# Patient Record
Sex: MALE | Race: OTHER | HISPANIC OR LATINO | Employment: OTHER | URBAN - METROPOLITAN AREA
[De-identification: names, ages, dates, MRNs, and addresses within clinical notes are randomized per-mention and may not be internally consistent; named-entity substitution may affect disease eponyms.]

---

## 2023-01-04 ENCOUNTER — NEW PATIENT (OUTPATIENT)
Dept: URBAN - METROPOLITAN AREA CLINIC 6 | Facility: CLINIC | Age: 23
End: 2023-01-04

## 2023-01-04 DIAGNOSIS — Z01.00: ICD-10-CM

## 2023-01-04 PROCEDURE — 92004 COMPRE OPH EXAM NEW PT 1/>: CPT

## 2023-01-04 PROCEDURE — 92015 DETERMINE REFRACTIVE STATE: CPT | Mod: NC

## 2023-01-04 ASSESSMENT — TONOMETRY
OD_IOP_MMHG: 14
OS_IOP_MMHG: 14

## 2023-01-04 ASSESSMENT — VISUAL ACUITY
OS_CC: J1+
OS_CC: 20/20
OD_CC: 20/20
OD_CC: J1+

## 2023-01-11 ENCOUNTER — CONTACT LENS FITTING (OUTPATIENT)
Dept: URBAN - METROPOLITAN AREA CLINIC 6 | Facility: CLINIC | Age: 23
End: 2023-01-11

## 2023-01-11 DIAGNOSIS — H52.13: ICD-10-CM

## 2023-01-11 PROCEDURE — 92310 CONTACT LENS FITTING OU: CPT

## 2023-01-11 ASSESSMENT — KERATOMETRY
OD_K2POWER_DIOPTERS: 41.25
OD_AXISANGLE2_DEGREES: 79
OD_AXISANGLE_DEGREES: 169
OD_K1POWER_DIOPTERS: 40.75
OS_AXISANGLE_DEGREES: 002
OS_K2POWER_DIOPTERS: 41.75
OS_K1POWER_DIOPTERS: 41.25
OS_AXISANGLE2_DEGREES: 92

## 2023-01-20 ENCOUNTER — CONTACT LENS CHECK (OUTPATIENT)
Dept: URBAN - METROPOLITAN AREA CLINIC 6 | Facility: CLINIC | Age: 23
End: 2023-01-20

## 2023-01-20 DIAGNOSIS — H52.13: ICD-10-CM

## 2023-01-20 PROCEDURE — 92310 CONTACT LENS FITTING OU: CPT | Mod: NC

## 2023-01-20 ASSESSMENT — VISUAL ACUITY
OD_CC: J1+
OD_CC: 20/20
OS_CC: 20/20
OS_CC: J1+

## 2023-01-20 ASSESSMENT — KERATOMETRY
OD_AXISANGLE_DEGREES: 169
OS_K1POWER_DIOPTERS: 41.25
OD_K2POWER_DIOPTERS: 41.25
OS_AXISANGLE2_DEGREES: 92
OD_AXISANGLE2_DEGREES: 79
OD_K1POWER_DIOPTERS: 40.75
OS_K2POWER_DIOPTERS: 41.75
OS_AXISANGLE_DEGREES: 002

## 2023-04-25 ENCOUNTER — OFFICE VISIT (OUTPATIENT)
Dept: URGENT CARE | Facility: CLINIC | Age: 23
End: 2023-04-25

## 2023-04-25 VITALS
OXYGEN SATURATION: 97 % | HEART RATE: 89 BPM | SYSTOLIC BLOOD PRESSURE: 125 MMHG | TEMPERATURE: 97.7 F | DIASTOLIC BLOOD PRESSURE: 80 MMHG | RESPIRATION RATE: 16 BRPM

## 2023-04-25 DIAGNOSIS — M25.561 ACUTE PAIN OF RIGHT KNEE: Primary | ICD-10-CM

## 2023-04-25 NOTE — PROGRESS NOTES
[facility]        NAME: Shireen Flores is a 25 y o  male  : 2000    MRN: [id number]  DATE: 2023  TIME: 5:14 PM    Assessment and Plan   Acute pain of right knee [M25 561]  1  Acute pain of right knee  Ambulatory Referral to Orthopedic Surgery        - Brace applied    Patient Instructions   - Wear brace as needed  - Follow up with ortho  - Take Tylenol or Motrin as needed  - Recommend applying ice   Follow up with PCP in 3-5 days  Proceed to  ER if symptoms worsen  Chief Complaint     Chief Complaint   Patient presents with   • Knee Pain     Pt reports right knee pain worsening over the past two weeks  Unsure if he did anything to injure it  History of Present Illness       26 y/o M presents for R knee pain x 2 weeks  Pt denies any injury or changes in activity  Admits is intermitant and worse with flexion of the knee  Review of Systems   Review of Systems   Musculoskeletal: Negative for gait problem and joint swelling  Joint pain         Current Medications     No current outpatient medications on file  Current Allergies     Allergies as of 2023   • (No Known Allergies)            The following portions of the patient's history were reviewed and updated as appropriate: allergies, current medications, past family history, past medical history, past social history, past surgical history and problem list      History reviewed  No pertinent past medical history  History reviewed  No pertinent surgical history  No family history on file  Medications have been verified  Objective   /80   Pulse 89   Temp 97 7 °F (36 5 °C)   Resp 16   SpO2 97%   No LMP for male patient  Physical Exam     Physical Exam  Vitals and nursing note reviewed  Constitutional:       General: He is not in acute distress  Appearance: He is obese  He is not toxic-appearing  HENT:      Head: Normocephalic and atraumatic     Eyes: Conjunctiva/sclera: Conjunctivae normal    Musculoskeletal:         General: No swelling, tenderness, deformity or signs of injury  Normal range of motion  Comments: PT pulse intact  Non tender  No swelling  Lachman's, anterior drawer, posterior drawer, Juanita negative  No medial or lateral laxity of joint   Skin:     Findings: No bruising or erythema  Neurological:      Mental Status: He is alert     Psychiatric:         Mood and Affect: Mood normal          Behavior: Behavior normal

## 2023-05-08 ENCOUNTER — OFFICE VISIT (OUTPATIENT)
Dept: OBGYN CLINIC | Facility: OTHER | Age: 23
End: 2023-05-08

## 2023-05-08 ENCOUNTER — APPOINTMENT (OUTPATIENT)
Dept: RADIOLOGY | Facility: OTHER | Age: 23
End: 2023-05-08

## 2023-05-08 VITALS
HEIGHT: 67 IN | HEART RATE: 76 BPM | DIASTOLIC BLOOD PRESSURE: 76 MMHG | WEIGHT: 235 LBS | SYSTOLIC BLOOD PRESSURE: 121 MMHG | BODY MASS INDEX: 36.88 KG/M2

## 2023-05-08 DIAGNOSIS — M25.561 ACUTE PAIN OF RIGHT KNEE: ICD-10-CM

## 2023-05-08 DIAGNOSIS — Z01.89 RADIOLOGICAL EXAMINATION, NOT ELSEWHERE CLASSIFIED: ICD-10-CM

## 2023-05-08 DIAGNOSIS — M22.2X1 PATELLOFEMORAL DISORDER OF RIGHT KNEE: ICD-10-CM

## 2023-05-08 DIAGNOSIS — M76.31 IT BAND SYNDROME, RIGHT: Primary | ICD-10-CM

## 2023-05-08 NOTE — LETTER
May 8, 2023     Ronna Miles PA-C  [address] [place] [id number]    Patient: Jerome Gracia   YOB: 2000   Date of Visit: 5/8/2023       Dear Dr Nick Aiken: Thank you for referring Jerome Gracia to me for evaluation  Below are my notes for this consultation  If you have questions, please do not hesitate to call me  I look forward to following your patient along with you  Sincerely,        Cindy Fox MD        CC: No Recipients  Cindy Fox MD  5/8/2023  1:50 PM  Signed    Assessment  Diagnoses and all orders for this visit:    It band syndrome, right  -     Ambulatory referral to Physical Therapy; Future    Patellofemoral disorder of right knee  -     Ambulatory referral to Physical Therapy; Future      Discussion and Plan:    The patient has an examination consistent with iliotibial band friction syndrome of the right knee as well as patellofemoral dysfunction of the right knee  There is no evidence of structural surgical pathology on exam today and I do not recommend further imaging at this point although in the future I would not be against further imaging if his symptoms do not improve  I have provided him with a referral to physical therapy to treat his symptoms and he will follow-up on an as-needed basis with further imaging the form of an MRI if he does not improve    Subjective:   Patient ID: Jerome Gracia is a 25 y o  male      The patient reports to the office today with a chief complaint of right knee pain  Pain is anterior and lateral  Patient states there is no known mechanism of injury and the pain started approximately 1 month ago  Patient works as a  and states he does a lot of squatting, kneeling, and getting in/out of a work truck and that's when his pain flares up  Patient has tried OTC pain medication and a knee brace that was provided to him from urgent care on 4/25/23   Patient has not attempted physical therapy at "this time  He reports no weakness or numbness/tingling  The following portions of the patient's history were reviewed and updated as appropriate: allergies, current medications, past family history, past medical history, past social history, past surgical history and problem list     Review of Systems    Objective:  /76   Pulse 76   Ht 5' 6 5\" (1 689 m)   Wt 107 kg (235 lb)   BMI 37 36 kg/m²       Right Knee Exam     Muscle Strength   The patient has normal right knee strength  Tenderness   The patient is experiencing tenderness in the lateral joint line  Range of Motion   The patient has normal right knee ROM  Tests   Juanita:  Medial - negative Lateral - negative  Varus: negative Valgus: negative  Lachman:  Anterior - negative      Drawer:  Posterior - negative  Patellar apprehension: negative    Other   Sensation: normal  Pulse: present  Swelling: none            Physical Exam  Musculoskeletal:      Right knee:      Instability Tests: Medial Juanita test negative and lateral Juanita test negative  Patient tender over Marcelina's tubercle and along tract of the IT band as well as the anterolateral patellar retinaculum      I have personally reviewed pertinent films in PACS and my interpretation is as follows  Xray Right Knee 5/8/23: no acute osseous abnormalities  Scribe Attestation    I,:  Kalie Holm am acting as a scribe while in the presence of the attending physician :       I,:  Effie Magallon MD personally performed the services described in this documentation    as scribed in my presence  :             "

## 2023-05-08 NOTE — PROGRESS NOTES
"  Assessment  Diagnoses and all orders for this visit:    It band syndrome, right  -     Ambulatory referral to Physical Therapy; Future    Patellofemoral disorder of right knee  -     Ambulatory referral to Physical Therapy; Future      Discussion and Plan:    The patient has an examination consistent with iliotibial band friction syndrome of the right knee as well as patellofemoral dysfunction of the right knee  There is no evidence of structural surgical pathology on exam today and I do not recommend further imaging at this point although in the future I would not be against further imaging if his symptoms do not improve  I have provided him with a referral to physical therapy to treat his symptoms and he will follow-up on an as-needed basis with further imaging the form of an MRI if he does not improve    Subjective:   Patient ID: Parviz Galdamez is a 25 y o  male      The patient reports to the office today with a chief complaint of right knee pain  Pain is anterior and lateral  Patient states there is no known mechanism of injury and the pain started approximately 1 month ago  Patient works as a  and states he does a lot of squatting, kneeling, and getting in/out of a work truck and that's when his pain flares up  Patient has tried OTC pain medication and a knee brace that was provided to him from urgent care on 4/25/23  Patient has not attempted physical therapy at this time  He reports no weakness or numbness/tingling  The following portions of the patient's history were reviewed and updated as appropriate: allergies, current medications, past family history, past medical history, past social history, past surgical history and problem list     Review of Systems    Objective:  /76   Pulse 76   Ht 5' 6 5\" (1 689 m)   Wt 107 kg (235 lb)   BMI 37 36 kg/m²       Right Knee Exam     Muscle Strength   The patient has normal right knee strength      Tenderness   The patient is experiencing " tenderness in the lateral joint line  Range of Motion   The patient has normal right knee ROM  Tests   Juanita:  Medial - negative Lateral - negative  Varus: negative Valgus: negative  Lachman:  Anterior - negative      Drawer:  Posterior - negative  Patellar apprehension: negative    Other   Sensation: normal  Pulse: present  Swelling: none            Physical Exam  Musculoskeletal:      Right knee:      Instability Tests: Medial Juanita test negative and lateral Juanita test negative  Patient tender over Marcelina's tubercle and along tract of the IT band as well as the anterolateral patellar retinaculum      I have personally reviewed pertinent films in PACS and my interpretation is as follows  Xray Right Knee 5/8/23: no acute osseous abnormalities         Scribe Attestation    I,:  Aron Sousa am acting as a scribe while in the presence of the attending physician :       I,:  Romain Barlow MD personally performed the services described in this documentation    as scribed in my presence :

## 2023-05-11 ENCOUNTER — EVALUATION (OUTPATIENT)
Dept: PHYSICAL THERAPY | Facility: OTHER | Age: 23
End: 2023-05-11

## 2023-05-11 DIAGNOSIS — M25.561 ACUTE PAIN OF RIGHT KNEE: Primary | ICD-10-CM

## 2023-05-11 NOTE — PROGRESS NOTES
PT Evaluation     Today's date: 2023  Patient name: Shari Moon  : 2000  MRN: [id number]  Referring provider: Helen Olguin*  Dx: No diagnosis found  Assessment  Assessment details: Shari Moon is a pleasant 25 y o  male who presents with R sided knee pain  Mostly with kneeling and stepping up into a high truck  No injections or medication at this time  Goals to be ible to kneel  and HEP issued and POC reviewed  Impairments: abnormal coordination, abnormal muscle firing, abnormal or restricted ROM, activity intolerance, impaired physical strength, lacks appropriate home exercise program, pain with function and poor body mechanics    Symptom irritability: moderateUnderstanding of Dx/Px/POC: good   Prognosis: good    Goals  Short Term:  Pt will report decreased levels of pain by at least 2 subjective ratings in 4 weeks  Pt will demonstrate improved ROM by at least 10 degrees in 4 weeks  Pt will demonstrate improved strength by 1/2 grade  Long Term:   Pt will be independent in their HEP in 8 weeks  Pt will be be pain free with IADL's  Pt will demonstrate improved FOTO, > 80         Plan  Plan details: Prognosis above is given PT services 2x/week tapering to 1x/week over the next 3 months and home program adherence    Patient would benefit from: skilled physical therapy  Planned modality interventions: thermotherapy: hydrocollator packs  Planned therapy interventions: activity modification, joint mobilization, manual therapy, motor coordination training, neuromuscular re-education, patient education, self care, therapeutic activities, therapeutic exercise, graded activity and home exercise program  Frequency: 2x week  Treatment plan discussed with: patient        Subjective Evaluation    Pain  At best pain ratin  At worst pain ratin    Patient Goals  Patient goals for therapy: decreased pain, independence with ADLs/IADLs, return to sport/leisure activities, increased motion and increased strength          Objective     General Comments:      Knee Comments  Knee: post drawer =  Lachmans test=   -   Valgus stress test=- varus stress test =   -Juanita test   =  - joint line tenderness=-  Patella grind test-Patella mobility test=-      too's test=   P step up and over test= R sided weakness  functional squat=R sided SL sit stand weakness   Thessaly test =-  Knee MMT Flexion: 4/5 extension: 4/5  Hip MMT:  4/5   Knee ROM flex and extension: full               Hip:  scour= -    karen =    -  capsular mobility=       -               Precautions: none       Manuals 5 11            EPAT  cermic head 2500 3 5-3 9 PT                                                    Neuro Re-Ed             Slider              Er into wall              miniauts              Leg press                                                     Ther Ex                                                                                                                     Ther Activity                                       Gait Training                                       Modalities

## 2023-05-17 ENCOUNTER — OFFICE VISIT (OUTPATIENT)
Dept: PHYSICAL THERAPY | Facility: OTHER | Age: 23
End: 2023-05-17

## 2023-05-17 DIAGNOSIS — M25.561 ACUTE PAIN OF RIGHT KNEE: Primary | ICD-10-CM

## 2023-05-17 NOTE — PROGRESS NOTES
Daily Note     Today's date: 2023  Patient name: June Tipton  : 2000  MRN: [id number]  Referring provider: Narcisa Hurtado*  Dx: No diagnosis found  Subjective: progressing very well  Objective: See treatment diary below      Assessment: Tolerated treatment well  Patient demonstrated fatigue post treatment      Plan: Continue per plan of care        Precautions: none       Manuals 5 11 5 17 23           EPAT  cermic head 2500 3 5-3 9 PT  Metal head 2500 3 3-4 0                                                   Neuro Re-Ed             Slider   3way 5            Er into wall   5''x10            minisqauts   #10 10x3            Leg press   eccentrics #60 10x3            Sports cord side and bebeto step  5 laps each                                      Ther Ex             MWM I/ER tibia   Stool 5''x10            bike  5min                                                                                          Ther Activity                                       Gait Training                                       Modalities

## 2023-06-18 ENCOUNTER — OFFICE VISIT (OUTPATIENT)
Dept: URGENT CARE | Facility: CLINIC | Age: 23
End: 2023-06-18
Payer: COMMERCIAL

## 2023-06-18 VITALS
DIASTOLIC BLOOD PRESSURE: 90 MMHG | RESPIRATION RATE: 20 BRPM | OXYGEN SATURATION: 97 % | TEMPERATURE: 98.5 F | SYSTOLIC BLOOD PRESSURE: 138 MMHG

## 2023-06-18 DIAGNOSIS — B08.4 HAND, FOOT AND MOUTH DISEASE: Primary | ICD-10-CM

## 2023-06-18 DIAGNOSIS — J02.9 SORE THROAT: ICD-10-CM

## 2023-06-18 LAB — S PYO AG THROAT QL: NEGATIVE

## 2023-06-18 PROCEDURE — 87070 CULTURE OTHR SPECIMN AEROBIC: CPT

## 2023-06-18 PROCEDURE — 99213 OFFICE O/P EST LOW 20 MIN: CPT

## 2023-06-18 PROCEDURE — 87880 STREP A ASSAY W/OPTIC: CPT

## 2023-06-18 RX ORDER — CETIRIZINE HYDROCHLORIDE 5 MG/1
5 TABLET, CHEWABLE ORAL DAILY
COMMUNITY

## 2023-06-18 RX ORDER — FLUTICASONE PROPIONATE 50 MCG
1 SPRAY, SUSPENSION (ML) NASAL DAILY
COMMUNITY

## 2023-06-18 NOTE — PATIENT INSTRUCTIONS
Your rapid strep test was negative  No antibiotics are needed at this time  Throat swab will be sent for definitive culture  You can download [facility] for the results which take approximately 48-72 hours  You will be notified if positive  You can give ibuprofen/Motrin and acetaminophen/Tylenol as needed for pain or fever  For sore throat you can use Cepacol lozenges, do warm salt water gargles, drink warm water with lemon or herbal teas, or use an over-the-counter throat spray (Chloraseptic)  Follow up with your PCP in 3-5 days if symptoms persist     Go to the ER if symptoms significantly worsen

## 2023-06-18 NOTE — LETTER
June 18, 2023     Patient: Venice Mcdonald   YOB: 2000   Date of Visit: 6/18/2023       To Whom it May Concern:    Venice Mcdonald was seen in my clinic on 6/18/2023  He may return to work on 6/21/23   If you have any questions or concerns, please don't hesitate to call           Sincerely,          JOVANNI Alejandre        CC: No Recipients

## 2023-06-18 NOTE — PROGRESS NOTES
[facility]        NAME: Efren Palafox is a 26 y o  male  : 2000    MRN: [id number]  DATE: 2023  TIME: 1:50 PM    Assessment and Plan   Hand, foot and mouth disease [B08 4]  1  Hand, foot and mouth disease        2  Sore throat  POCT rapid strepA    Throat culture            Patient Instructions     Your rapid strep test was negative  No antibiotics are needed at this time  Throat swab will be sent for definitive culture  You can download [address] for the results which take approximately 48-72 hours  You will be notified if positive  You can give ibuprofen/Motrin and acetaminophen/Tylenol as needed for pain or fever  For sore throat you can use Cepacol lozenges, do warm salt water gargles, drink warm water with lemon or herbal teas, or use an over-the-counter throat spray (Chloraseptic)  Follow up with your PCP in 3-5 days if symptoms persist     Go to the ER if symptoms significantly worsen  Chief Complaint     Chief Complaint   Patient presents with   • Sore Throat     Pt reports sore throat x3 days   • Fever     Wed night to Thursday morning pt had a fever   • Bumps on B/L hands and feet     Pt reports bumps developed on hands and feet  Pt reports that they are painful with palpation and when he bears weight on feet  History of Present Illness       This is a 21yo male who presents for evaluation of a sore throat x3 days  He reports associated fevers and a painful rash to his hands and feet  The rash is not anywhere else on his body, it is not itchy  Denies HA, nasal congestion, rhinorrhea, cough, CP/SOB, and N/V/D  No known sick contacts  Review of Systems   Review of Systems   Constitutional: Positive for fever  Negative for chills and fatigue  HENT: Positive for sore throat  Negative for congestion, ear pain, rhinorrhea and sinus pressure  Eyes: Negative for discharge and redness     Respiratory: Negative for cough, chest tightness and shortness of breath  Cardiovascular: Negative for chest pain and palpitations  Gastrointestinal: Negative for abdominal pain, diarrhea, nausea and vomiting  Musculoskeletal: Negative for arthralgias and myalgias  Skin: Positive for rash  Neurological: Negative for dizziness, light-headedness and headaches  Current Medications       Current Outpatient Medications:   •  cetirizine (ZyrTEC) 5 MG chewable tablet, Chew 5 mg daily, Disp: , Rfl:   •  fluticasone (FLONASE) 50 mcg/act nasal spray, 1 spray into each nostril daily, Disp: , Rfl:     Current Allergies     Allergies as of 06/18/2023   • (No Known Allergies)            The following portions of the patient's history were reviewed and updated as appropriate: allergies, current medications, past family history, past medical history, past social history, past surgical history and problem list      History reviewed  No pertinent past medical history  History reviewed  No pertinent surgical history  History reviewed  No pertinent family history  Medications have been verified  Objective   /90   Temp 98 5 °F (36 9 °C) (Temporal)   Resp 20   SpO2 97%        Physical Exam     Physical Exam  Vitals and nursing note reviewed  Constitutional:       General: He is not in acute distress  Appearance: He is obese  He is not ill-appearing, toxic-appearing or diaphoretic  HENT:      Head: Normocephalic  Right Ear: Tympanic membrane, ear canal and external ear normal       Left Ear: Tympanic membrane, ear canal and external ear normal       Nose: Nose normal       Mouth/Throat:      Mouth: Mucous membranes are moist       Pharynx: Posterior oropharyngeal erythema present  No oropharyngeal exudate  Eyes:      Conjunctiva/sclera: Conjunctivae normal    Cardiovascular:      Rate and Rhythm: Normal rate and regular rhythm  Pulses: Normal pulses  Heart sounds: Normal heart sounds     Pulmonary:      Effort: Pulmonary effort is normal       Breath sounds: Normal breath sounds  Musculoskeletal:         General: Normal range of motion  Cervical back: Normal range of motion and neck supple  Lymphadenopathy:      Cervical: No cervical adenopathy  Skin:     General: Skin is warm and dry  Capillary Refill: Capillary refill takes less than 2 seconds  Findings: Rash present  Rash is papular and vesicular  Comments: Rash to palms of hands and soles of feet   Neurological:      Mental Status: He is alert and oriented to person, place, and time

## 2023-06-20 LAB — BACTERIA THROAT CULT: NORMAL

## 2025-07-02 ENCOUNTER — APPOINTMENT (OUTPATIENT)
Dept: LAB | Facility: CLINIC | Age: 25
End: 2025-07-02
Attending: PHYSICIAN ASSISTANT
Payer: COMMERCIAL

## 2025-07-02 ENCOUNTER — APPOINTMENT (OUTPATIENT)
Dept: RADIOLOGY | Facility: CLINIC | Age: 25
End: 2025-07-02
Attending: PHYSICIAN ASSISTANT
Payer: COMMERCIAL

## 2025-07-02 ENCOUNTER — APPOINTMENT (OUTPATIENT)
Dept: URGENT CARE | Facility: CLINIC | Age: 25
End: 2025-07-02
Payer: COMMERCIAL

## 2025-07-02 DIAGNOSIS — Z02.1 PHYSICAL EXAM, PRE-EMPLOYMENT: ICD-10-CM

## 2025-07-02 DIAGNOSIS — Z02.1 PHYSICAL EXAM, PRE-EMPLOYMENT: Primary | ICD-10-CM

## 2025-07-02 LAB
ALBUMIN SERPL BCG-MCNC: 4.9 G/DL (ref 3.5–5)
ALP SERPL-CCNC: 74 U/L (ref 34–104)
ALT SERPL W P-5'-P-CCNC: 39 U/L (ref 7–52)
ANION GAP SERPL CALCULATED.3IONS-SCNC: 10 MMOL/L (ref 4–13)
AST SERPL W P-5'-P-CCNC: 25 U/L (ref 13–39)
ATRIAL RATE: 65 BPM
BASOPHILS # BLD AUTO: 0.07 THOUSANDS/ÂΜL (ref 0–0.1)
BASOPHILS NFR BLD AUTO: 1 % (ref 0–1)
BILIRUB SERPL-MCNC: 0.87 MG/DL (ref 0.2–1)
BUN SERPL-MCNC: 12 MG/DL (ref 5–25)
CALCIUM SERPL-MCNC: 9.6 MG/DL (ref 8.4–10.2)
CHLORIDE SERPL-SCNC: 102 MMOL/L (ref 96–108)
CHOLEST SERPL-MCNC: 193 MG/DL (ref ?–200)
CO2 SERPL-SCNC: 27 MMOL/L (ref 21–32)
CREAT SERPL-MCNC: 0.87 MG/DL (ref 0.6–1.3)
EOSINOPHIL # BLD AUTO: 0.24 THOUSAND/ÂΜL (ref 0–0.61)
EOSINOPHIL NFR BLD AUTO: 2 % (ref 0–6)
ERYTHROCYTE [DISTWIDTH] IN BLOOD BY AUTOMATED COUNT: 13.2 % (ref 11.6–15.1)
GFR SERPL CREATININE-BSD FRML MDRD: 120 ML/MIN/1.73SQ M
GLUCOSE P FAST SERPL-MCNC: 93 MG/DL (ref 65–99)
HCT VFR BLD AUTO: 50.9 % (ref 36.5–49.3)
HDLC SERPL-MCNC: 44 MG/DL
HGB BLD-MCNC: 16.4 G/DL (ref 12–17)
IMM GRANULOCYTES # BLD AUTO: 0.02 THOUSAND/UL (ref 0–0.2)
IMM GRANULOCYTES NFR BLD AUTO: 0 % (ref 0–2)
LDLC SERPL CALC-MCNC: 128 MG/DL (ref 0–100)
LYMPHOCYTES # BLD AUTO: 4.13 THOUSANDS/ÂΜL (ref 0.6–4.47)
LYMPHOCYTES NFR BLD AUTO: 36 % (ref 14–44)
MCH RBC QN AUTO: 28.7 PG (ref 26.8–34.3)
MCHC RBC AUTO-ENTMCNC: 32.2 G/DL (ref 31.4–37.4)
MCV RBC AUTO: 89 FL (ref 82–98)
MONOCYTES # BLD AUTO: 1.06 THOUSAND/ÂΜL (ref 0.17–1.22)
MONOCYTES NFR BLD AUTO: 9 % (ref 4–12)
NEUTROPHILS # BLD AUTO: 6.02 THOUSANDS/ÂΜL (ref 1.85–7.62)
NEUTS SEG NFR BLD AUTO: 52 % (ref 43–75)
NONHDLC SERPL-MCNC: 149 MG/DL
NRBC BLD AUTO-RTO: 0 /100 WBCS
P AXIS: 38 DEGREES
PLATELET # BLD AUTO: 372 THOUSANDS/UL (ref 149–390)
PMV BLD AUTO: 10.5 FL (ref 8.9–12.7)
POTASSIUM SERPL-SCNC: 3.6 MMOL/L (ref 3.5–5.3)
PR INTERVAL: 156 MS
PROT SERPL-MCNC: 7.7 G/DL (ref 6.4–8.4)
QRS AXIS: 33 DEGREES
QRSD INTERVAL: 116 MS
QT INTERVAL: 390 MS
QTC INTERVAL: 406 MS
RBC # BLD AUTO: 5.72 MILLION/UL (ref 3.88–5.62)
SODIUM SERPL-SCNC: 139 MMOL/L (ref 135–147)
T WAVE AXIS: 32 DEGREES
TRIGL SERPL-MCNC: 105 MG/DL (ref ?–150)
VENTRICULAR RATE: 65 BPM
WBC # BLD AUTO: 11.54 THOUSAND/UL (ref 4.31–10.16)

## 2025-07-02 PROCEDURE — 85025 COMPLETE CBC W/AUTO DIFF WBC: CPT

## 2025-07-02 PROCEDURE — 36415 COLL VENOUS BLD VENIPUNCTURE: CPT

## 2025-07-02 PROCEDURE — 80061 LIPID PANEL: CPT

## 2025-07-02 PROCEDURE — 93005 ELECTROCARDIOGRAM TRACING: CPT

## 2025-07-02 PROCEDURE — 80053 COMPREHEN METABOLIC PANEL: CPT

## 2025-07-02 PROCEDURE — 71045 X-RAY EXAM CHEST 1 VIEW: CPT
